# Patient Record
Sex: FEMALE | URBAN - METROPOLITAN AREA
[De-identification: names, ages, dates, MRNs, and addresses within clinical notes are randomized per-mention and may not be internally consistent; named-entity substitution may affect disease eponyms.]

---

## 2020-06-30 DIAGNOSIS — Z11.59 ENCOUNTER FOR SCREENING FOR OTHER VIRAL DISEASES: Primary | ICD-10-CM

## 2020-08-01 DIAGNOSIS — Z11.59 ENCOUNTER FOR SCREENING FOR OTHER VIRAL DISEASES: ICD-10-CM

## 2020-08-01 LAB
LABORATORY COMMENT REPORT: NORMAL
SARS-COV-2 RNA SPEC QL NAA+PROBE: NEGATIVE
SARS-COV-2 RNA SPEC QL NAA+PROBE: NORMAL
SPECIMEN SOURCE: NORMAL
SPECIMEN SOURCE: NORMAL

## 2020-08-01 PROCEDURE — U0003 INFECTIOUS AGENT DETECTION BY NUCLEIC ACID (DNA OR RNA); SEVERE ACUTE RESPIRATORY SYNDROME CORONAVIRUS 2 (SARS-COV-2) (CORONAVIRUS DISEASE [COVID-19]), AMPLIFIED PROBE TECHNIQUE, MAKING USE OF HIGH THROUGHPUT TECHNOLOGIES AS DESCRIBED BY CMS-2020-01-R: HCPCS | Performed by: PLASTIC SURGERY

## 2020-08-04 ENCOUNTER — ANESTHESIA (OUTPATIENT)
Dept: SURGERY | Facility: CLINIC | Age: 40
End: 2020-08-04
Payer: COMMERCIAL

## 2020-08-04 ENCOUNTER — HOSPITAL ENCOUNTER (OUTPATIENT)
Facility: CLINIC | Age: 40
Discharge: HOME | End: 2020-08-04
Attending: PLASTIC SURGERY | Admitting: PLASTIC SURGERY
Payer: COMMERCIAL

## 2020-08-04 ENCOUNTER — ANESTHESIA EVENT (OUTPATIENT)
Dept: SURGERY | Facility: CLINIC | Age: 40
End: 2020-08-04
Payer: COMMERCIAL

## 2020-08-04 VITALS
HEIGHT: 66 IN | SYSTOLIC BLOOD PRESSURE: 127 MMHG | HEART RATE: 76 BPM | OXYGEN SATURATION: 98 % | BODY MASS INDEX: 24.64 KG/M2 | TEMPERATURE: 96.9 F | RESPIRATION RATE: 16 BRPM | WEIGHT: 153.3 LBS | DIASTOLIC BLOOD PRESSURE: 86 MMHG

## 2020-08-04 DIAGNOSIS — R11.0 NAUSEA: ICD-10-CM

## 2020-08-04 DIAGNOSIS — R52 PAIN: Primary | ICD-10-CM

## 2020-08-04 LAB — HCG UR QL: NEGATIVE

## 2020-08-04 PROCEDURE — 25000125 ZZHC RX 250: Performed by: PLASTIC SURGERY

## 2020-08-04 PROCEDURE — 25000128 H RX IP 250 OP 636: Performed by: NURSE ANESTHETIST, CERTIFIED REGISTERED

## 2020-08-04 PROCEDURE — 27210794 ZZH OR GENERAL SUPPLY STERILE: Performed by: PLASTIC SURGERY

## 2020-08-04 PROCEDURE — 40000170 ZZH STATISTIC PRE-PROCEDURE ASSESSMENT II: Performed by: PLASTIC SURGERY

## 2020-08-04 PROCEDURE — 36000058 ZZH SURGERY LEVEL 3 EA 15 ADDTL MIN: Performed by: PLASTIC SURGERY

## 2020-08-04 PROCEDURE — 25000566 ZZH SEVOFLURANE, EA 15 MIN: Performed by: PLASTIC SURGERY

## 2020-08-04 PROCEDURE — 71000012 ZZH RECOVERY PHASE 1 LEVEL 1 FIRST HR: Performed by: PLASTIC SURGERY

## 2020-08-04 PROCEDURE — 25000125 ZZHC RX 250: Performed by: NURSE ANESTHETIST, CERTIFIED REGISTERED

## 2020-08-04 PROCEDURE — 37000009 ZZH ANESTHESIA TECHNICAL FEE, EACH ADDTL 15 MIN: Performed by: PLASTIC SURGERY

## 2020-08-04 PROCEDURE — 81025 URINE PREGNANCY TEST: CPT | Performed by: PLASTIC SURGERY

## 2020-08-04 PROCEDURE — 88305 TISSUE EXAM BY PATHOLOGIST: CPT | Mod: 26 | Performed by: PLASTIC SURGERY

## 2020-08-04 PROCEDURE — 25800030 ZZH RX IP 258 OP 636: Performed by: NURSE ANESTHETIST, CERTIFIED REGISTERED

## 2020-08-04 PROCEDURE — 37000008 ZZH ANESTHESIA TECHNICAL FEE, 1ST 30 MIN: Performed by: PLASTIC SURGERY

## 2020-08-04 PROCEDURE — 36000056 ZZH SURGERY LEVEL 3 1ST 30 MIN: Performed by: PLASTIC SURGERY

## 2020-08-04 PROCEDURE — 88305 TISSUE EXAM BY PATHOLOGIST: CPT | Performed by: PLASTIC SURGERY

## 2020-08-04 PROCEDURE — 71000027 ZZH RECOVERY PHASE 2 EACH 15 MINS: Performed by: PLASTIC SURGERY

## 2020-08-04 PROCEDURE — 25000128 H RX IP 250 OP 636: Performed by: PLASTIC SURGERY

## 2020-08-04 PROCEDURE — 71000013 ZZH RECOVERY PHASE 1 LEVEL 1 EA ADDTL HR: Performed by: PLASTIC SURGERY

## 2020-08-04 RX ORDER — ONDANSETRON 4 MG/1
4 TABLET, ORALLY DISINTEGRATING ORAL EVERY 30 MIN PRN
Status: DISCONTINUED | OUTPATIENT
Start: 2020-08-04 | End: 2020-08-04 | Stop reason: HOSPADM

## 2020-08-04 RX ORDER — ONDANSETRON 2 MG/ML
4 INJECTION INTRAMUSCULAR; INTRAVENOUS EVERY 30 MIN PRN
Status: DISCONTINUED | OUTPATIENT
Start: 2020-08-04 | End: 2020-08-04 | Stop reason: HOSPADM

## 2020-08-04 RX ORDER — PROPOFOL 10 MG/ML
INJECTION, EMULSION INTRAVENOUS PRN
Status: DISCONTINUED | OUTPATIENT
Start: 2020-08-04 | End: 2020-08-04

## 2020-08-04 RX ORDER — ONDANSETRON 4 MG/1
4-8 TABLET, ORALLY DISINTEGRATING ORAL EVERY 8 HOURS PRN
Qty: 10 TABLET | Refills: 0 | Status: SHIPPED | OUTPATIENT
Start: 2020-08-04

## 2020-08-04 RX ORDER — MAGNESIUM HYDROXIDE 1200 MG/15ML
LIQUID ORAL PRN
Status: DISCONTINUED | OUTPATIENT
Start: 2020-08-04 | End: 2020-08-04 | Stop reason: HOSPADM

## 2020-08-04 RX ORDER — ACETAMINOPHEN 325 MG/1
650 TABLET ORAL
Status: DISCONTINUED | OUTPATIENT
Start: 2020-08-04 | End: 2020-08-04 | Stop reason: HOSPADM

## 2020-08-04 RX ORDER — SODIUM CHLORIDE, SODIUM LACTATE, POTASSIUM CHLORIDE, CALCIUM CHLORIDE 600; 310; 30; 20 MG/100ML; MG/100ML; MG/100ML; MG/100ML
INJECTION, SOLUTION INTRAVENOUS CONTINUOUS
Status: DISCONTINUED | OUTPATIENT
Start: 2020-08-04 | End: 2020-08-04 | Stop reason: HOSPADM

## 2020-08-04 RX ORDER — LIDOCAINE HYDROCHLORIDE 20 MG/ML
INJECTION, SOLUTION INFILTRATION; PERINEURAL PRN
Status: DISCONTINUED | OUTPATIENT
Start: 2020-08-04 | End: 2020-08-04

## 2020-08-04 RX ORDER — ONDANSETRON 4 MG/1
4 TABLET, ORALLY DISINTEGRATING ORAL
Status: DISCONTINUED | OUTPATIENT
Start: 2020-08-04 | End: 2020-08-04 | Stop reason: HOSPADM

## 2020-08-04 RX ORDER — HYDROCODONE BITARTRATE AND ACETAMINOPHEN 5; 325 MG/1; MG/1
1 TABLET ORAL
Status: DISCONTINUED | OUTPATIENT
Start: 2020-08-04 | End: 2020-08-04 | Stop reason: HOSPADM

## 2020-08-04 RX ORDER — CEFAZOLIN SODIUM 2 G/100ML
2 INJECTION, SOLUTION INTRAVENOUS
Status: COMPLETED | OUTPATIENT
Start: 2020-08-04 | End: 2020-08-04

## 2020-08-04 RX ORDER — ONDANSETRON 2 MG/ML
INJECTION INTRAMUSCULAR; INTRAVENOUS PRN
Status: DISCONTINUED | OUTPATIENT
Start: 2020-08-04 | End: 2020-08-04

## 2020-08-04 RX ORDER — BUPIVACAINE HYDROCHLORIDE AND EPINEPHRINE 2.5; 5 MG/ML; UG/ML
INJECTION, SOLUTION INFILTRATION; PERINEURAL PRN
Status: DISCONTINUED | OUTPATIENT
Start: 2020-08-04 | End: 2020-08-04 | Stop reason: HOSPADM

## 2020-08-04 RX ORDER — DEXAMETHASONE SODIUM PHOSPHATE 4 MG/ML
INJECTION, SOLUTION INTRA-ARTICULAR; INTRALESIONAL; INTRAMUSCULAR; INTRAVENOUS; SOFT TISSUE PRN
Status: DISCONTINUED | OUTPATIENT
Start: 2020-08-04 | End: 2020-08-04

## 2020-08-04 RX ORDER — BUPIVACAINE HYDROCHLORIDE AND EPINEPHRINE 2.5; 5 MG/ML; UG/ML
INJECTION, SOLUTION EPIDURAL; INFILTRATION; INTRACAUDAL; PERINEURAL
Status: DISCONTINUED
Start: 2020-08-04 | End: 2020-08-04 | Stop reason: HOSPADM

## 2020-08-04 RX ORDER — DEXTROAMPHETAMINE SACCHARATE, AMPHETAMINE ASPARTATE MONOHYDRATE, DEXTROAMPHETAMINE SULFATE AND AMPHETAMINE SULFATE 2.5; 2.5; 2.5; 2.5 MG/1; MG/1; MG/1; MG/1
10 CAPSULE, EXTENDED RELEASE ORAL DAILY
COMMUNITY

## 2020-08-04 RX ORDER — HYDROMORPHONE HYDROCHLORIDE 1 MG/ML
.3-.5 INJECTION, SOLUTION INTRAMUSCULAR; INTRAVENOUS; SUBCUTANEOUS EVERY 5 MIN PRN
Status: DISCONTINUED | OUTPATIENT
Start: 2020-08-04 | End: 2020-08-04 | Stop reason: HOSPADM

## 2020-08-04 RX ORDER — HYDROCODONE BITARTRATE AND ACETAMINOPHEN 5; 325 MG/1; MG/1
1-2 TABLET ORAL EVERY 4 HOURS PRN
Qty: 30 TABLET | Refills: 0 | Status: SHIPPED | OUTPATIENT
Start: 2020-08-04

## 2020-08-04 RX ORDER — SODIUM CHLORIDE, SODIUM LACTATE, POTASSIUM CHLORIDE, CALCIUM CHLORIDE 600; 310; 30; 20 MG/100ML; MG/100ML; MG/100ML; MG/100ML
INJECTION, SOLUTION INTRAVENOUS CONTINUOUS PRN
Status: DISCONTINUED | OUTPATIENT
Start: 2020-08-04 | End: 2020-08-04

## 2020-08-04 RX ORDER — PROPOFOL 10 MG/ML
INJECTION, EMULSION INTRAVENOUS CONTINUOUS PRN
Status: DISCONTINUED | OUTPATIENT
Start: 2020-08-04 | End: 2020-08-04

## 2020-08-04 RX ORDER — FENTANYL CITRATE 0.05 MG/ML
25-50 INJECTION, SOLUTION INTRAMUSCULAR; INTRAVENOUS
Status: DISCONTINUED | OUTPATIENT
Start: 2020-08-04 | End: 2020-08-04 | Stop reason: HOSPADM

## 2020-08-04 RX ORDER — EPINEPHRINE 0.3 MG/.3ML
0.3 INJECTION SUBCUTANEOUS PRN
COMMUNITY

## 2020-08-04 RX ORDER — CEFAZOLIN SODIUM 1 G/3ML
1 INJECTION, POWDER, FOR SOLUTION INTRAMUSCULAR; INTRAVENOUS SEE ADMIN INSTRUCTIONS
Status: DISCONTINUED | OUTPATIENT
Start: 2020-08-04 | End: 2020-08-04 | Stop reason: HOSPADM

## 2020-08-04 RX ORDER — NALOXONE HYDROCHLORIDE 0.4 MG/ML
.1-.4 INJECTION, SOLUTION INTRAMUSCULAR; INTRAVENOUS; SUBCUTANEOUS
Status: DISCONTINUED | OUTPATIENT
Start: 2020-08-04 | End: 2020-08-04 | Stop reason: HOSPADM

## 2020-08-04 RX ORDER — EPINEPHRINE 1 MG/ML
INJECTION, SOLUTION INTRAMUSCULAR; SUBCUTANEOUS
Status: DISCONTINUED
Start: 2020-08-04 | End: 2020-08-04 | Stop reason: WASHOUT

## 2020-08-04 RX ORDER — FENTANYL CITRATE 50 UG/ML
INJECTION, SOLUTION INTRAMUSCULAR; INTRAVENOUS PRN
Status: DISCONTINUED | OUTPATIENT
Start: 2020-08-04 | End: 2020-08-04

## 2020-08-04 RX ORDER — HYDROXYZINE HYDROCHLORIDE 25 MG/1
25 TABLET, FILM COATED ORAL
Status: DISCONTINUED | OUTPATIENT
Start: 2020-08-04 | End: 2020-08-04 | Stop reason: HOSPADM

## 2020-08-04 RX ADMIN — PROPOFOL 50 MG: 10 INJECTION, EMULSION INTRAVENOUS at 13:04

## 2020-08-04 RX ADMIN — HYDROMORPHONE HYDROCHLORIDE 0.5 MG: 1 INJECTION, SOLUTION INTRAMUSCULAR; INTRAVENOUS; SUBCUTANEOUS at 13:00

## 2020-08-04 RX ADMIN — SODIUM CHLORIDE, POTASSIUM CHLORIDE, SODIUM LACTATE AND CALCIUM CHLORIDE: 600; 310; 30; 20 INJECTION, SOLUTION INTRAVENOUS at 12:36

## 2020-08-04 RX ADMIN — DEXAMETHASONE SODIUM PHOSPHATE 4 MG: 4 INJECTION, SOLUTION INTRA-ARTICULAR; INTRALESIONAL; INTRAMUSCULAR; INTRAVENOUS; SOFT TISSUE at 12:51

## 2020-08-04 RX ADMIN — FENTANYL CITRATE 100 MCG: 50 INJECTION, SOLUTION INTRAMUSCULAR; INTRAVENOUS at 12:46

## 2020-08-04 RX ADMIN — PROPOFOL 30 MCG/KG/MIN: 10 INJECTION, EMULSION INTRAVENOUS at 12:50

## 2020-08-04 RX ADMIN — PROPOFOL 50 MG: 10 INJECTION, EMULSION INTRAVENOUS at 12:53

## 2020-08-04 RX ADMIN — ONDANSETRON 4 MG: 2 INJECTION INTRAMUSCULAR; INTRAVENOUS at 14:10

## 2020-08-04 RX ADMIN — LIDOCAINE HYDROCHLORIDE 100 MG: 20 INJECTION, SOLUTION INFILTRATION; PERINEURAL at 12:46

## 2020-08-04 RX ADMIN — MIDAZOLAM 2 MG: 1 INJECTION INTRAMUSCULAR; INTRAVENOUS at 12:36

## 2020-08-04 RX ADMIN — CEFAZOLIN SODIUM 2 G: 2 INJECTION, SOLUTION INTRAVENOUS at 12:49

## 2020-08-04 RX ADMIN — PROPOFOL 200 MG: 10 INJECTION, EMULSION INTRAVENOUS at 12:46

## 2020-08-04 ASSESSMENT — LIFESTYLE VARIABLES: TOBACCO_USE: 0

## 2020-08-04 ASSESSMENT — ENCOUNTER SYMPTOMS: SEIZURES: 0

## 2020-08-04 ASSESSMENT — MIFFLIN-ST. JEOR: SCORE: 1392.11

## 2020-08-04 NOTE — DISCHARGE INSTRUCTIONS
Discharge Instructions following Breast Surgery  [facility]    Diet:   Resume diet as tolerated.  Drink plenty of fluids to prevent constipation.    Activity:   Gentle rotation & stretching of your arms/shoulders will prevent stiffness in joints   Increase activity gradually   No heavy lifting greater than 10-15 pounds & no strenuous activity until  approved by surgeon    Bathing/Incision Care:   You may shower as directed by surgeon   Pat incisions dry.  No lotions, powders or perfumes to incisions   Tape dressings (steri strips) will fall off in 7-10 days (if present)    What to expect:   A tingly or itchy sensation around the incision is a normal sign of healing   Some clear, pink drainage from incisions is normal.      Notify your surgeon for the following signs & symptoms:   Redness, warmth, or swelling of the incision    Foul smelling or increased drainage   Chills or temperature greater than 101 F   Pain not controlled by pain medications    Hank Hess Drain  Home Care Instructions    What is a Hank Hess (TESSA) Drain?  This is a small tube that connects to a bulb.  Its gentle suction removes extra fluid from a surgical wound.  Your doctor will remove the tube when the amount of fluid decreases.  The color and amount of fluid varies.  Right after surgery the fluid is bright red.  Over time, it changes to light pink and may become clear or the color of straw.    How should I care for my tube site?    Keep the skin around the tube dry.  Check with your doctor about how to shower.  You may need to cover the site with plastic when you shower.  Or, it may be okay to let the site get wet and put on a clean bandage after you shower.      If the bandage gets wet, you will need to change it.  How should I care for the bulb?    Keep the bulb compressed at all times except while you empty it.     Attach the bulb to your clothing with tape and a safety pin.    Try to empty the bulb at the  same time every day.  Empty the bulb at least once a day, or when the bulb becomes half full.  If it becomes too full, there will not be enough suction.    To empty the bulb:    Wash your hands.    Open the bulb cap.    Drain the fluid from the bulb into the measuring cup.  If you have two drains, use two cups.      Clean the mouth of the bulb with an alcohol wipe if your nurse told you to.    Squeeze the bulb (fold it in half before you close the bulb cap) If it does not stay compressed, call your nurse or clinic.    Write the amount of drainage on the drainage record (see back page).  If you have two drains, write the amount for each bulb.    Flush the drainage down the toilet.  Rinse the measuring cup.    Wash your hands.    When should I call my doctor?   Call your doctor if:    You have a fever over 101 F (38.3 C), taken under the tongue.     The drainage increases or smells bad.    The skin around your tube has increased redness, swelling, warmth or pain.    You have pus or fluid leaking at the tube site.    Your stitches break.    You think the tube is not draining.    The tube falls out.    You have any problems or concerns.    Your drainage record:    Empty your bulb at least once per day or when 1/2 to 1/3 full.  Write down the date, time and amount of drainage in each bulb.   Bring this record to each clinic visit.    Date Time Bulb 1: amount of  Drainage in (ml or cc) Bulb 2: amount of drainage (in ml or cc) Notes                                                        Same Day Surgery Discharge Instructions for  Sedation and General Anesthesia       It's not unusual to feel dizzy, light-headed or faint for up to 24 hours after surgery or while taking pain medication.  If you have these symptoms: sit for a few minutes before standing and have someone assist you when you get up to walk or use the bathroom.      You should rest and relax for the next 24 hours. We recommend you make arrangements to have an  adult stay with you for at least 24 hours after your discharge.  Avoid hazardous and strenuous activity.      DO NOT DRIVE any vehicle or operate mechanical equipment for 24 hours following the end of your surgery.  Even though you may feel normal, your reactions may be affected by the medication you have received.      Do not drink alcoholic beverages for 24 hours following surgery.       Slowly progress to your regular diet as you feel able. It's not unusual to feel nauseated and/or vomit after receiving anesthesia.  If you develop these symptoms, drink clear liquids (apple juice, ginger ale, broth, 7-up, etc. ) until you feel better.  If your nausea and vomiting persists for 24 hours, please notify your surgeon.        All narcotic pain medications, along with inactivity and anesthesia, can cause constipation. Drinking plenty of liquids and increasing fiber intake will help.      For any questions of a medical nature, call your surgeon.      Do not make important decisions for 24 hours.      If you had general anesthesia, you may have a sore throat for a couple of days related to the breathing tube used during surgery.  You may use Cepacol lozenges to help with this discomfort.  If it worsens or if you develop a fever, contact your surgeon.       If you feel your pain is not well managed with the pain medications prescribed by your surgeon, please contact your surgeon's office to let them know so they can address your concerns.       CoVid 19 Information    We want to give you information regarding Covid. Please consult your primary care provider with any questions you might have.     Patient who have symptoms (cough, fever, or shortness of breath), need to isolate for 7 days from when symptoms started OR 72 hours after fever resolves (without fever reducing medications) AND improvement of respiratory symptoms (whichever is longer).      Isolate yourself at home (in own room/own bathroom if possible)    Do Not  allow any visitors    Do Not go to work or school    Do Not go to [employer],  centers, shopping, or other public places.    Do Not shake hands.    Avoid close and intimate contact with others (hugging, kissing).    Follow CDC recommendations for household cleaning of frequently touched services.     After the initial 7 days, continue to isolate yourself from household members as much as possible. To continue decrease the risk of community spread and exposure, you and any members of your household should limit activities in public for 14 days after starting home isolation.     You can reference the following CDC link for helpful home isolation/care tips:  https://www.cdc.gov/coronavirus/2019-ncov/downloads/10Things.pdf    Protect Others:    Cover Your Mouth and Nose with a mask, disposable tissue or wash cloth to avoid spreading germs to others.    Wash your hands and face frequently with soap and water    Call Your Primary Doctor If: Breathing difficulty develops or you become worse.    For more information about COVID19 and options for caring for yourself at home, please visit the CDC website at https://www.cdc.gov/coronavirus/2019-ncov/about/steps-when-sick.html  For more options for care at [facility], please visit our website at [email or web address]    **If you have questions or concerns about your procedure,  call Dr. Lester at [phone]**

## 2020-08-04 NOTE — OP NOTE
Procedure Date: 08/04/2020      PREOPERATIVE DIAGNOSIS:  Bilateral symptomatic macromastia.      POSTOPERATIVE DIAGNOSIS:  Bilateral symptomatic macromastia.      PROCEDURE:  Bilateral breast reduction.      SURGEON:  Oracio Lester MD      ANESTHESIA:  General.      COMPLICATIONS:  None.      ESTIMATED BLOOD LOSS:  50 mL      INDICATIONS:  The patient is a 40-year-old female who has bilateral symptomatic macromastia and desires bilateral breast reduction.  I discussed with her preoperatively the proposed procedure, its risks, alternatives, potential benefits and she would like to proceed.      DESCRIPTION OF PROCEDURE:  The patient was marked and taken to the operating room and placed supine.  After smooth induction of general anesthesia, she was prepped and draped sterilely.  Beginning on the right hand side, I had previously marked her Angulo pattern and I completed the markings including an inferior medial-based pedicle.  These were then injected with 0.25% Marcaine with epinephrine and then scored.  An inferior medial-based pedicle was developed by de-epithelialization and dissection of chest wall, leaving a broad vascular base.  Tissue superiorly was elevated off the pectoral fascia and the upper flap was then trimmed appropriately.  Hemostasis was achieved with electrocautery.  I injected Marcaine subfascially and then placed a drain in the bed.  Total weight resected 370 grams.  The breast was coned into position with 2-0 Vicryls on the horizontal and vertical limbs and the nipple-areolar complex inset with 3-0 Monocryl.        I then directed my attention to the contralateral side where a similar procedure was performed.  We resected 390 grams from this left side.        At this point, she had excellent shape and symmetry.  Final closure was performed with 3-0 and 4-0 Monocryl sutures and Steri-Strips were placed.  Estimated blood loss 50 mL.  She was awakened from anesthesia and taken to the recovery room  in good condition.         LD LINARES MD             D: 2020   T: 2020   MT: JACQUELINE      Name:     ABHI LOPEZ   MRN:      [id number]        Account:        [id number]   :      1980           Procedure Date: 2020      Document: [id number]

## 2020-08-04 NOTE — ANESTHESIA POSTPROCEDURE EVALUATION
Patient: Belem Wyatt    Procedure(s):  BILATERAL BREAST REDUCTION MAMMOPLASTY    Diagnosis:Hypertrophy of breast [N62]  Neck pain [M54.2]  Shoulder pain [M25.519]  Pain in thoracic spine [M54.6]  Diagnosis Additional Information: No value filed.    Anesthesia Type:  General    Note:  Anesthesia Post Evaluation    Patient location during evaluation: PACU  Patient participation: Able to fully participate in evaluation  Level of consciousness: awake  Pain management: adequate  Airway patency: patent  Cardiovascular status: acceptable  Respiratory status: acceptable  Hydration status: acceptable  PONV: none     Anesthetic complications: None          Last vitals:  Vitals:    08/04/20 1445 08/04/20 1450 08/04/20 1500   BP: (!) 111/96 126/85 135/87   Pulse: 94  87   Resp: 10 14 9   Temp: 36.7  C (98  F)     SpO2: 100% 100% 100%         Electronically Signed By: Ivory Alexander MD, MD  August 4, 2020  3:13 PM

## 2020-08-04 NOTE — BRIEF OP NOTE
[facility]    Brief Operative Note    Pre-operative diagnosis: Hypertrophy of breast [N62]  Neck pain [M54.2]  Shoulder pain [M25.519]  Pain in thoracic spine [M54.6]  Post-operative diagnosis Same    Procedure: Procedure(s):  BILATERAL BREAST REDUCTION MAMMOPLASTY  Surgeon: Surgeon(s) and Role:     * Oracio Lester MD - Primary  Anesthesia: General   Estimated blood loss: 50 ml  Drains: 10 Khmer gm han  Specimens:   ID Type Source Tests Collected by Time Destination   A : RIGHT BREAST TISSUE Tissue Breast, Right SURGICAL PATHOLOGY EXAM Oracio Lester MD 8/4/2020  1:14 PM    B : LEFT BREAST TISSUE Tissue Breast, Left SURGICAL PATHOLOGY EXAM Oracio Lester MD 8/4/2020  1:16 PM      Findings:   Normal tissue, Right 370 grams, Left 390 grams.  Complications: None.

## 2020-08-04 NOTE — OR NURSING
TESSA teaching reviewed with patient and she demonstrated well on how to manage drains. Will also review with .

## 2020-08-04 NOTE — ANESTHESIA CARE TRANSFER NOTE
Patient: Belem Wyatt    Procedure(s):  BILATERAL BREAST REDUCTION MAMMOPLASTY    Diagnosis: Hypertrophy of breast [N62]  Neck pain [M54.2]  Shoulder pain [M25.519]  Pain in thoracic spine [M54.6]  Diagnosis Additional Information: No value filed.    Anesthesia Type:   General     Note:  Airway :Face Mask  Patient transferred to:PACU  Comments: Pt exhibits spontaneous respirations, follows commands, suctioned, LMA removed, exchanging well, transferred to pacu with O2 @ 10L via mask, all monitors and alarms on, report to RN, VSS.Handoff Report: Identifed the Patient, Identified the Reponsible Provider, Reviewed the pertinent medical history, Discussed the surgical course, Reviewed Intra-OP anesthesia mangement and issues during anesthesia, Set expectations for post-procedure period and Allowed opportunity for questions and acknowledgement of understanding      Vitals: (Last set prior to Anesthesia Care Transfer)    CRNA VITALS  8/4/2020 1412 - 8/4/2020 1449      8/4/2020             NIBP:  137/85    NIBP Mean:  107    Resp Rate (set):  10                Electronically Signed By: CAROLIN Barrera CRNA  August 4, 2020  2:49 PM

## 2020-08-04 NOTE — ANESTHESIA PREPROCEDURE EVALUATION
"Anesthesia Pre-Procedure Evaluation    Patient: Belem Wyatt   MRN: [id number] : 1980          Preoperative Diagnosis: Hypertrophy of breast [N62]  Neck pain [M54.2]  Shoulder pain [M25.519]  Pain in thoracic spine [M54.6]    Procedure(s):  BILATERAL BREAST REDUCTION MAMMOPLASTY    Past Medical History:   Diagnosis Date     ADD (attention deficit disorder)      Vitamin D deficiency      Past Surgical History:   Procedure Laterality Date     LASIK BILATERAL         Anesthesia Evaluation     .             ROS/MED HX    ENT/Pulmonary:      (-) tobacco use, asthma and sleep apnea   Neurologic:      (-) seizures and CVA   Cardiovascular:        (-) hypertension   METS/Exercise Tolerance:     Hematologic:        (-) anemia   Musculoskeletal:         GI/Hepatic:        (-) GERD and liver disease   Renal/Genitourinary:      (-) renal disease   Endo:      (-) Type II DM, thyroid disease and obesity   Psychiatric:     (+) psychiatric history Psychiatric Hx List: ADD.      Infectious Disease:         Malignancy:         Other:                          Physical Exam      Airway   Mallampati: II  TM distance: >3 FB  Neck ROM: full    Dental     Cardiovascular   Rhythm and rate: regular      Pulmonary             Lab Results   Component Value Date    HCG Negative 2020       Preop Vitals  BP Readings from Last 3 Encounters:   20 (!) 138/93    Pulse Readings from Last 3 Encounters:   20 82      Resp Readings from Last 3 Encounters:   20 18    SpO2 Readings from Last 3 Encounters:   20 98%      Temp Readings from Last 1 Encounters:   20 36.3  C (97.4  F) (Temporal)    Ht Readings from Last 1 Encounters:   20 1.676 m (5' 6\")      Wt Readings from Last 1 Encounters:   20 69.5 kg (153 lb 4.8 oz)    Estimated body mass index is 24.74 kg/m  as calculated from the following:    Height as of this encounter: 1.676 m (5' 6\").    Weight as of this encounter: 69.5 kg (153 lb " 4.8 oz).       Anesthesia Plan      History & Physical Review  History and physical reviewed and following examination; no interval change.    ASA Status:  1 .    NPO Status:  > 8 hours    Plan for General with Propofol (LMA) induction. Maintenance will be Balanced.    PONV prophylaxis:  Ondansetron (or other 5HT-3) and Dexamethasone or Solumedrol  Background propofol infusion        Postoperative Care  Postoperative pain management:  Oral pain medications and IV analgesics.      Consents  Anesthetic plan, risks, benefits and alternatives discussed with:  Patient..                 Demarco Hilliard MD

## 2020-08-07 LAB — COPATH REPORT: NORMAL

## (undated) DEVICE — SOL NACL 0.9% IRRIG 1000ML BOTTLE 2F7124

## (undated) DEVICE — PACK MAJOR SBA15MAFSI

## (undated) DEVICE — PREP SKIN SCRUB TRAY 4461A

## (undated) DEVICE — DRAIN JACKSON PRATT 10FR ROUND SU130-1321

## (undated) DEVICE — SU VICRYL 2-0 FS-1 27" UND  J443H

## (undated) DEVICE — SU MONOCRYL 3-0 PS-2 27" Y427H

## (undated) DEVICE — GLOVE PROTEXIS W/NEU-THERA 8.0  2D73TE80

## (undated) DEVICE — SYR 03ML LL W/O NDL 309657

## (undated) DEVICE — GLOVE PROTEXIS BLUE W/NEU-THERA 6.0  2D73EB60

## (undated) DEVICE — BLADE KNIFE SURG 10 371110

## (undated) DEVICE — MAMMARY SUPPORT SM LATEX

## (undated) DEVICE — PAD CHUX UNDERPAD 23X24" 7136

## (undated) DEVICE — SOL WATER IRRIG 1000ML BOTTLE 2F7114

## (undated) DEVICE — SUCTION CANISTER MEDIVAC LINER 3000ML W/LID 65651-530

## (undated) DEVICE — ESU PENCIL W/SMOKE EVAC E2515HS

## (undated) DEVICE — NDL SPINAL 22GA 3.5" QUINCKE 405181

## (undated) DEVICE — GLOVE PROTEXIS W/NEU-THERA 6.5  2D73TE65

## (undated) DEVICE — DRAPE BREAST/CHEST 29420

## (undated) DEVICE — DRAIN JACKSON PRATT RESERVOIR 100ML SU130-1305

## (undated) DEVICE — LINEN TOWEL PACK X5 5464

## (undated) DEVICE — SU MONOCRYL 4-0 PS-2 18" UND Y496G

## (undated) DEVICE — DRSG STERI STRIP 1/2X4" R1547

## (undated) DEVICE — GLOVE PROTEXIS W/NEU-THERA 7.0  2D73TE70

## (undated) DEVICE — DRSG ABDOMINAL 12X16"

## (undated) RX ORDER — CEFAZOLIN SODIUM 2 G/100ML
INJECTION, SOLUTION INTRAVENOUS
Status: DISPENSED
Start: 2020-08-04

## (undated) RX ORDER — PROPOFOL 10 MG/ML
INJECTION, EMULSION INTRAVENOUS
Status: DISPENSED
Start: 2020-08-04

## (undated) RX ORDER — GLYCOPYRROLATE 0.2 MG/ML
INJECTION, SOLUTION INTRAMUSCULAR; INTRAVENOUS
Status: DISPENSED
Start: 2020-08-04

## (undated) RX ORDER — ONDANSETRON 2 MG/ML
INJECTION INTRAMUSCULAR; INTRAVENOUS
Status: DISPENSED
Start: 2020-08-04

## (undated) RX ORDER — DEXAMETHASONE SODIUM PHOSPHATE 4 MG/ML
INJECTION, SOLUTION INTRA-ARTICULAR; INTRALESIONAL; INTRAMUSCULAR; INTRAVENOUS; SOFT TISSUE
Status: DISPENSED
Start: 2020-08-04

## (undated) RX ORDER — FENTANYL CITRATE 50 UG/ML
INJECTION, SOLUTION INTRAMUSCULAR; INTRAVENOUS
Status: DISPENSED
Start: 2020-08-04

## (undated) RX ORDER — HYDROMORPHONE HYDROCHLORIDE 1 MG/ML
INJECTION, SOLUTION INTRAMUSCULAR; INTRAVENOUS; SUBCUTANEOUS
Status: DISPENSED
Start: 2020-08-04